# Patient Record
Sex: MALE | URBAN - METROPOLITAN AREA
[De-identification: names, ages, dates, MRNs, and addresses within clinical notes are randomized per-mention and may not be internally consistent; named-entity substitution may affect disease eponyms.]

---

## 2021-12-15 ENCOUNTER — APPOINTMENT (OUTPATIENT)
Dept: URBAN - METROPOLITAN AREA CLINIC 273 | Age: 61
Setting detail: DERMATOLOGY
End: 2021-12-23

## 2021-12-15 DIAGNOSIS — L28.1 PRURIGO NODULARIS: ICD-10-CM

## 2021-12-15 PROCEDURE — OTHER MEDICATION COUNSELING: OTHER

## 2021-12-15 PROCEDURE — 11900 INJECT SKIN LESIONS </W 7: CPT

## 2021-12-15 PROCEDURE — OTHER COUNSELING: OTHER

## 2021-12-15 PROCEDURE — OTHER INTRALESIONAL KENALOG: OTHER

## 2021-12-15 PROCEDURE — OTHER PRESCRIPTION: OTHER

## 2021-12-15 RX ORDER — CLOBETASOL PROPIONATE 0.5 MG/G
OINTMENT TOPICAL
Qty: 45 | Refills: 1 | Status: ERX | COMMUNITY
Start: 2021-12-15

## 2021-12-15 ASSESSMENT — LOCATION SIMPLE DESCRIPTION DERM: LOCATION SIMPLE: RIGHT FOREARM

## 2021-12-15 ASSESSMENT — LOCATION DETAILED DESCRIPTION DERM: LOCATION DETAILED: RIGHT DISTAL DORSAL FOREARM

## 2021-12-15 ASSESSMENT — LOCATION ZONE DERM: LOCATION ZONE: ARM

## 2021-12-15 NOTE — PROCEDURE: MEDICATION COUNSELING
Xelclydez Pregnancy And Lactation Text: This medication is Pregnancy Category D and is not considered safe during pregnancy.  The risk during breast feeding is also uncertain.

## 2021-12-15 NOTE — PROCEDURE: MEDICATION COUNSELING
Regarding: Just had Bilateral intra-articular hip injection, has had a fever for 2 days  ----- Message from Marah Mederos sent at 9/22/2019 10:37 AM CDT -----  Patient Name: Greg Castellano  Specialist or PCP: Dr. Shaggy Nova MD  Pregnant (If Yes, how long?): n/a  Symptoms: Just had Bilateral intra-articular hip injection, has had a fever for 2 days  Call Back #: [phone]  Is the patient’s permanent residence located in [place], [place], or a [facility]? Yes [facility] [address]  Call Center Account #: [id number]       Topical Sulfur Applications Counseling: Topical Sulfur Counseling: Patient counseled that this medication may cause skin irritation or allergic reactions.  In the event of skin irritation, the patient was advised to reduce the amount of the drug applied or use it less frequently.   The patient verbalized understanding of the proper use and possible adverse effects of topical sulfur application.  All of the patient's questions and concerns were addressed.

## 2021-12-15 NOTE — PROCEDURE: MEDICATION COUNSELING
Statement Selected Solaraze Pregnancy And Lactation Text: This medication is Pregnancy Category B and is considered safe. There is some data to suggest avoiding during the third trimester. It is unknown if this medication is excreted in breast milk.

## 2021-12-15 NOTE — PROCEDURE: MEDICATION COUNSELING
oral Cimzia Counseling:  I discussed with the patient the risks of Cimzia including but not limited to immunosuppression, allergic reactions and infections.  The patient understands that monitoring is required including a PPD at baseline and must alert us or the primary physician if symptoms of infection or other concerning signs are noted.

## 2021-12-15 NOTE — PROCEDURE: INTRALESIONAL KENALOG
Concentration Of Solution Injected (Mg/Ml): 5.0
Consent: The risks of atrophy were reviewed with the patient.
Total Volume Injected (Ccs- Only Use Numbers And Decimals): .25
Validate Note Data When Using Inventory: Yes
X Size Of Lesion In Cm (Optional): 0
Detail Level: Detailed
Include Z78.9 (Other Specified Conditions Influencing Health Status) As An Associated Diagnosis?: No
Kenalog Preparation: Kenalog
Administered By (Optional): Dr Veronica Salvador
Medical Necessity Clause: This procedure was medically necessary because the lesions that were treated were:

## 2021-12-15 NOTE — PROCEDURE: MEDICATION COUNSELING
Never Dupixent Pregnancy And Lactation Text: This medication likely crosses the placenta but the risk for the fetus is uncertain. This medication is excreted in breast milk.

## 2022-01-26 ENCOUNTER — APPOINTMENT (OUTPATIENT)
Dept: URBAN - METROPOLITAN AREA CLINIC 273 | Age: 62
Setting detail: DERMATOLOGY
End: 2022-01-31

## 2022-01-26 DIAGNOSIS — L28.1 PRURIGO NODULARIS: ICD-10-CM

## 2022-01-26 PROCEDURE — OTHER PRESCRIPTION MEDICATION MANAGEMENT: OTHER

## 2022-01-26 PROCEDURE — 99213 OFFICE O/P EST LOW 20 MIN: CPT

## 2022-01-26 PROCEDURE — OTHER COUNSELING: OTHER

## 2022-01-26 PROCEDURE — OTHER MEDICATION COUNSELING: OTHER

## 2022-01-26 PROCEDURE — OTHER GENTLE SKIN CARE INSTRUCTIONS: OTHER

## 2022-01-26 PROCEDURE — OTHER MIPS QUALITY: OTHER

## 2023-02-06 NOTE — PROCEDURE: MEDICATION COUNSELING
Mirvaso Pregnancy And Lactation Text: This medication has not been assigned a Pregnancy Risk Category. It is unknown if the medication is excreted in breast milk. 06-Feb-2023 21:59